# Patient Record
Sex: MALE | Race: WHITE | NOT HISPANIC OR LATINO | URBAN - METROPOLITAN AREA
[De-identification: names, ages, dates, MRNs, and addresses within clinical notes are randomized per-mention and may not be internally consistent; named-entity substitution may affect disease eponyms.]

---

## 2018-01-01 ENCOUNTER — EMERGENCY (EMERGENCY)
Facility: HOSPITAL | Age: 48
LOS: 1 days | Discharge: HOME | End: 2018-01-01
Admitting: EMERGENCY MEDICINE
Payer: COMMERCIAL

## 2018-01-01 VITALS
HEART RATE: 60 BPM | OXYGEN SATURATION: 98 % | SYSTOLIC BLOOD PRESSURE: 123 MMHG | RESPIRATION RATE: 16 BRPM | TEMPERATURE: 98 F | DIASTOLIC BLOOD PRESSURE: 76 MMHG

## 2018-01-01 VITALS
HEART RATE: 65 BPM | DIASTOLIC BLOOD PRESSURE: 85 MMHG | RESPIRATION RATE: 18 BRPM | OXYGEN SATURATION: 98 % | SYSTOLIC BLOOD PRESSURE: 143 MMHG | WEIGHT: 190.04 LBS | TEMPERATURE: 98 F

## 2018-01-01 DIAGNOSIS — T78.1XXA OTHER ADVERSE FOOD REACTIONS, NOT ELSEWHERE CLASSIFIED, INITIAL ENCOUNTER: ICD-10-CM
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
## 2018-01-01 DIAGNOSIS — T78.40XA ALLERGY, UNSPECIFIED, INITIAL ENCOUNTER: ICD-10-CM
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
## 2018-01-01 PROCEDURE — 99284 EMERGENCY DEPT VISIT MOD MDM: CPT

## 2018-01-01 RX ORDER — EPINEPHRINE 0.3 MG/.3ML
0.3 INJECTION INTRAMUSCULAR; SUBCUTANEOUS
Qty: 1 | Refills: 0 | OUTPATIENT
Start: 2018-01-01

## 2018-01-01 RX ORDER — DIPHENHYDRAMINE HCL 50 MG
25 CAPSULE ORAL ONCE
Qty: 0 | Refills: 0 | Status: COMPLETED | OUTPATIENT
Start: 2018-01-01 | End: 2018-01-01

## 2018-01-01 RX ORDER — FAMOTIDINE 10 MG/ML
20 INJECTION INTRAVENOUS ONCE
Qty: 0 | Refills: 0 | Status: COMPLETED | OUTPATIENT
Start: 2018-01-01 | End: 2018-01-01

## 2018-01-01 RX ORDER — DIPHENHYDRAMINE HCL 50 MG
1 CAPSULE ORAL
Qty: 16 | Refills: 0 | OUTPATIENT
Start: 2018-01-01 | End: 2018-01-04

## 2018-01-01 RX ORDER — FAMOTIDINE 10 MG/ML
1 INJECTION INTRAVENOUS
Qty: 8 | Refills: 0 | OUTPATIENT
Start: 2018-01-01 | End: 2018-01-04

## 2018-01-01 RX ORDER — SODIUM CHLORIDE 9 MG/ML
1000 INJECTION INTRAMUSCULAR; INTRAVENOUS; SUBCUTANEOUS ONCE
Qty: 0 | Refills: 0 | Status: COMPLETED | OUTPATIENT
Start: 2018-01-01 | End: 2018-01-01

## 2018-01-01 RX ADMIN — Medication 60 MILLIGRAM(S): at 15:11

## 2018-01-01 RX ADMIN — Medication 120 MILLIGRAM(S): at 10:30

## 2018-01-01 RX ADMIN — FAMOTIDINE 20 MILLIGRAM(S): 10 INJECTION INTRAVENOUS at 10:30

## 2018-01-01 RX ADMIN — Medication 25 MILLIGRAM(S): at 10:30

## 2018-01-01 RX ADMIN — SODIUM CHLORIDE 1000 MILLILITER(S): 9 INJECTION INTRAMUSCULAR; INTRAVENOUS; SUBCUTANEOUS at 10:30

## 2018-01-01 NOTE — ED PROVIDER NOTE - ENMT, MLM
Airway patent, Nasal mucosa clear. Mouth with normal mucosa. Throat has no vesicles, no oropharyngeal exudates and uvula is midline. No swelling or close noted to tongue and throat. Airway patent, Nasal mucosa clear. Mouth with normal mucosa. Throat has no vesicles, no oropharyngeal exudates and uvula is midline. No pharyngeal or throat swelling

## 2018-01-01 NOTE — ED PROVIDER NOTE - OBJECTIVE STATEMENT
48 yo M with no PMHx presents c/o allergic rxn. Pt states was out last night for new Year's tim dinner which consisted of fish and seafood and felt in the morning swelling of tongue and eye lids and itching of hands. Denies throat closure, trouble breathing, or history of allergies. 46 yo M with no PMHx presents c/o allergic reaction. Pt states was out last night for new year's tim dinner which consisted of fish and seafood and felt in the morning swelling of tongue and eye lids and itching of hands. Denies throat closure or throat tightness, trouble breathing. No hx allergies. Noted to have diarrhea today. Epi pen and Benadryl 50mg PO administered by EMS prior to ED arrival

## 2018-01-01 NOTE — ED ADULT TRIAGE NOTE - CHIEF COMPLAINT QUOTE
here for swelling to face, throat and eyes- states he is unaware of what he is allergic to- was given 0.3 Epi Sub Q and 50mg of benadryl PTA by urgent care

## 2018-01-01 NOTE — ED PROVIDER NOTE - SKIN, MLM
Skin normal color for race, warm, dry and intact. No evidence of rash. No signs of hives or redness. +hives to arms

## 2018-01-01 NOTE — ED PROVIDER NOTE - MEDICAL DECISION MAKING DETAILS
Pt presents for suspected allergic rxn s/p seafood ingestion. Will give IV fluids, Benadryl, Pepcid, and Solu-Medrol, and ordered cardiac monitor. allergic reaction vs possible scombroid poisoning. received epi IM and benadryl 50mg PO prior to ED arrival. Will give IV fluids, Benadryl, Pepcid, and SoluMedrol, and observe.

## 2021-10-14 NOTE — ED ADULT NURSE NOTE - OBJECTIVE STATEMENT
Patient found on stretcher in ED with head of stretcher elevated; patient agreeable to PT.
Pt states he ate fish and shellfish last night, woke up with angioedema. Pt unsure of what he is allergic to, states no known allergies. Pt denies SOB. Pt appears in no apparent distress, will continue to monitor